# Patient Record
Sex: FEMALE | Race: WHITE | NOT HISPANIC OR LATINO | URBAN - NONMETROPOLITAN AREA
[De-identification: names, ages, dates, MRNs, and addresses within clinical notes are randomized per-mention and may not be internally consistent; named-entity substitution may affect disease eponyms.]

---

## 2019-11-06 ENCOUNTER — IMPORTED ENCOUNTER (OUTPATIENT)
Dept: URBAN - NONMETROPOLITAN AREA CLINIC 1 | Facility: CLINIC | Age: 64
End: 2019-11-06

## 2019-11-06 PROBLEM — H52.4: Noted: 2019-11-06

## 2019-11-06 PROBLEM — H25.13: Noted: 2019-11-06

## 2019-11-06 PROCEDURE — 92015 DETERMINE REFRACTIVE STATE: CPT

## 2019-11-06 PROCEDURE — 92004 COMPRE OPH EXAM NEW PT 1/>: CPT

## 2019-11-06 NOTE — PATIENT DISCUSSION
Presbyopia OUDiscussed refractive status in detail with patient. New glasses Rx given today. (not rec to fill)Continue to monitor. Yumiko OUDiscussed diagnosis with patient. Reviewed symptoms related to cataract progression. Discussed various treatment options with patient. Recommend cataract evaluation by Dr. Darshan Varela. Patient elects to schedule cat eval.Continue to monitor.

## 2019-12-16 ENCOUNTER — IMPORTED ENCOUNTER (OUTPATIENT)
Dept: URBAN - NONMETROPOLITAN AREA CLINIC 1 | Facility: CLINIC | Age: 64
End: 2019-12-16

## 2019-12-16 PROBLEM — H25.13: Noted: 2019-12-16

## 2019-12-16 PROCEDURE — 99214 OFFICE O/P EST MOD 30 MIN: CPT

## 2019-12-16 NOTE — PATIENT DISCUSSION
Cataract OU-Visually significant cataract OU.-Cataract(s) causing symptomatic impairment of visual function not correctable with a tolerable change in glasses or contact lenses lighting or non-operative means resulting in specific activity limitations and/or participation restrictions including but not limited to reading viewing television driving or meeting vocational or recreational needs. -Expectation is clearer vision and functional improvement in symptoms as well as reduced glare disability after cataract removal.-Order IOLMaster and OPD today. -Recommend LRI or toric/LenSx OU based on today's OPD testing and lifestyle questionnaire.-All questions were answered regarding surgery including pre and post-op medications appointments activity restrictions and anesthetic usage.-The risks benefits and alternatives and special risk factors for the patient were discussed in detail including but not limited to: bleeding infection retinal detachment vitreous loss problems with the implant and possible need for additional surgery.-Although rare the possibility of complete vision loss was discussed.-The possible need for glasses post-operatively was discussed.-Order medical clearance exam based on history of high cholesterol.-Patient elects to proceed with cataract surgery OD. Will schedule at patient's convenience and re-evaluate OS in the future. ***Patient will consider LRI or toric/LenSx OU (and will let us know) starting with OD. **Recommend begin art tears and lotemax and then repeat lai in 7 days.

## 2020-02-25 ENCOUNTER — IMPORTED ENCOUNTER (OUTPATIENT)
Dept: URBAN - NONMETROPOLITAN AREA CLINIC 1 | Facility: CLINIC | Age: 65
End: 2020-02-25

## 2020-02-25 PROBLEM — H25.13: Noted: 2020-02-25

## 2020-03-02 ENCOUNTER — IMPORTED ENCOUNTER (OUTPATIENT)
Dept: URBAN - NONMETROPOLITAN AREA CLINIC 1 | Facility: CLINIC | Age: 65
End: 2020-03-02

## 2020-03-02 PROBLEM — E78.5: Noted: 2020-03-02

## 2020-03-02 PROBLEM — Z01.818: Noted: 2020-03-02

## 2020-03-02 NOTE — PATIENT DISCUSSION
Cataract OU-Visually significant cataract OU.-Cataract(s) causing symptomatic impairment of visual function not correctable with a tolerable change in glasses or contact lenses lighting or non-operative means resulting in specific activity limitations and/or participation restrictions including but not limited to reading viewing television driving or meeting vocational or recreational needs. -Expectation is clearer vision and functional improvement in symptoms as well as reduced glare disability after cataract removal.-Recommend LRI or toric/LenSx OU-All questions were answered regarding surgery including pre and post-op medications appointments activity restrictions and anesthetic usage.-The risks benefits and alternatives and special risk factors for the patient were discussed in detail including but not limited to: bleeding infection retinal detachment vitreous loss problems with the implant and possible need for additional surgery.-Although rare the possibility of complete vision loss was discussed.-The possible need for glasses post-operatively was discussed.-Patient elects to proceed with cataract surgery OD. Will schedule at patient's convenience and re-evaluate OS in the future. ***Patient will consider LRI or toric/LenSx OU (and will let us know) starting with OD. **Recommend begin art tears and lotemax and then repeat lai in 7 days.

## 2020-11-02 ENCOUNTER — IMPORTED ENCOUNTER (OUTPATIENT)
Dept: URBAN - NONMETROPOLITAN AREA CLINIC 1 | Facility: CLINIC | Age: 65
End: 2020-11-02

## 2020-11-02 PROCEDURE — 92014 COMPRE OPH EXAM EST PT 1/>: CPT

## 2020-11-02 NOTE — PATIENT DISCUSSION
Cataract OU-Visually significant cataract OU.-Cataract(s) causing symptomatic impairment of visual function not correctable with a tolerable change in glasses or contact lenses lighting or non-operative means resulting in specific activity limitations and/or participation restrictions including but not limited to reading viewing television driving or meeting vocational or recreational needs. -Expectation is clearer vision and functional improvement in symptoms as well as reduced glare disability after cataract removal.-Recommend LRI or toric/LenSx OU-All questions were answered regarding surgery including pre and post-op medications appointments activity restrictions and anesthetic usage.-The risks benefits and alternatives and special risk factors for the patient were discussed in detail including but not limited to: bleeding infection retinal detachment vitreous loss problems with the implant and possible need for additional surgery.-Although rare the possibility of complete vision loss was discussed.-The possible need for glasses post-operatively was discussed.-Patient elects to proceed with cataract surgery OD. Will schedule at patient's convenience and re-evaluate OS in the future. ***Patient will consider LRI or toric/LenSx OU (and will let us know) starting Discussed all lens options in detail with patient. Discussed lens options with patient. Discussed Toric vs LRI . Recommend patient start AT and Lotemax TID  OU x 1 week and repeat lai. Dros given to patient Discussed LenSx vs Trad.  Discussed Toric OU and will consider this after repeat toposDo not recommend Panoptix

## 2020-11-17 ENCOUNTER — IMPORTED ENCOUNTER (OUTPATIENT)
Dept: URBAN - NONMETROPOLITAN AREA CLINIC 1 | Facility: CLINIC | Age: 65
End: 2020-11-17

## 2020-11-19 PROBLEM — Z01.818: Noted: 2020-11-19

## 2020-12-03 ENCOUNTER — IMPORTED ENCOUNTER (OUTPATIENT)
Dept: URBAN - NONMETROPOLITAN AREA CLINIC 1 | Facility: CLINIC | Age: 65
End: 2020-12-03

## 2020-12-03 PROBLEM — Z98.41: Noted: 2020-12-03

## 2020-12-03 PROBLEM — H25.812: Noted: 2020-12-03

## 2020-12-03 PROCEDURE — V2787 ASTIGMATISM-CORRECT FUNCTION: HCPCS

## 2020-12-03 PROCEDURE — 66984 XCAPSL CTRC RMVL W/O ECP: CPT

## 2020-12-03 PROCEDURE — 99024 POSTOP FOLLOW-UP VISIT: CPT

## 2020-12-03 PROCEDURE — 92136 OPHTHALMIC BIOMETRY: CPT

## 2020-12-03 NOTE — PATIENT DISCUSSION
Same Day s/p PCIOL OD Toric/LenSx (12/03/20)-Pt doing well s/p PCIOL. -Continue post-op gtts according to instruction sheet and sleep with eye shield over eye for 7 nights.-Avoid bending at the waist lifting anything over 5lbs and dirty or cyril environments. Cataract OS- Recommend proceed with cataract surgery as previously scheduled and discussed with Dr. Jayant Ibanez.

## 2020-12-08 ENCOUNTER — IMPORTED ENCOUNTER (OUTPATIENT)
Dept: URBAN - NONMETROPOLITAN AREA CLINIC 1 | Facility: CLINIC | Age: 65
End: 2020-12-08

## 2020-12-08 PROCEDURE — 99024 POSTOP FOLLOW-UP VISIT: CPT

## 2020-12-08 NOTE — PATIENT DISCUSSION
1 week s/p PCIOL OD Toric/LenSx (12/03/20)-Pt doing well at 1 week s/p PCIOL. -Continue post-op gtts according to instruction sheet.-Okay to resume usual activites and d/c eye shield. Cataract OS- Recommend proceed with cataract surgery as previously scheduled and discussed with Dr. Rosio Jane.

## 2020-12-18 ENCOUNTER — IMPORTED ENCOUNTER (OUTPATIENT)
Dept: URBAN - NONMETROPOLITAN AREA CLINIC 1 | Facility: CLINIC | Age: 65
End: 2020-12-18

## 2020-12-18 PROBLEM — Z98.41: Noted: 2020-12-18

## 2020-12-18 PROBLEM — Z98.42: Noted: 2020-12-18

## 2020-12-18 PROCEDURE — 99024 POSTOP FOLLOW-UP VISIT: CPT

## 2020-12-18 NOTE — PATIENT DISCUSSION
Same Day s/p PCIOL OS Toric/LenSx (12/17/20)-Pt doing well s/p PCIOL. -Continue post-op gtts according to instruction sheet and sleep with eye shield over eye for 7 nights.-Avoid bending at the waist lifting anything over 5lbs and dirty or cyril environments. 2 week s/p PCIOL OD Toric/LenSx (12/03/20)-Pt doing well at 2 week s/p PCIOL. -Continue post-op gtts according to instruction sheet.

## 2022-04-09 ASSESSMENT — VISUAL ACUITY
OS_CC: 20/40
OS_SC: 20/25
OD_SC: 20/30-
OS_SC: 20/25
OS_CC: 20/40
OD_SC: 20/25
OS_GLARE: 20/80
OS_CC: 20/40-
OD_GLARE: 20/100
OD_PAM: 20/20
OS_SC: 20/25
OD_GLARE: 20/100
OS_SC: 20/25
OS_CC: 20/40
OD_CC: 20/40-1
OD_SC: 20/30-
OS_CC: 20/40 -
OS_SC: 20/25
OD_CC: 20/25 +
OD_SC: 20/25
OS_SC: 20/30-
OS_GLARE: 20/80
OD_GLARE: 20/100
OD_PAM: 20/20
OS_AM: 20/20
OD_GLARE: 20/100
OD_PAM: 20/20
OD_SC: 20/30+
OD_CC: J1
OS_GLARE: 20/80
OS_GLARE: 20/80
OS_AM: 20/20
OD_SC: 20/25
OD_GLARE: 20/100
OS_CC: J1
OD_PAM: 20/20
OS_CC: 20/25
OS_CC: 20/40-
OD_CC: 20/20-
OS_GLARE: 20/80
OS_AM: 20/20
OS_GLARE: 20/80
OS_AM: 20/20
OD_PAM: 20/20
OD_CC: J1
OS_AM: 20/20
OS_AM: 20/20
OS_CC: 20/25
OS_CC: J1

## 2022-04-09 ASSESSMENT — TONOMETRY
OD_IOP_MMHG: 17
OD_IOP_MMHG: 13
OD_IOP_MMHG: 18
OS_IOP_MMHG: 15
OD_IOP_MMHG: 16
OS_IOP_MMHG: 16
OD_IOP_MMHG: 16
OS_IOP_MMHG: 16
OD_IOP_MMHG: 18
OS_IOP_MMHG: 18
OD_IOP_MMHG: 15
OS_IOP_MMHG: 13
OS_IOP_MMHG: 18
OS_IOP_MMHG: 16

## 2022-04-09 ASSESSMENT — KERATOMETRY
OD_K1POWER_DIOPTERS: 43.25
OD_AXISANGLE_DEGREES: 169
OD_K2POWER_DIOPTERS: 44.00
OS_K1POWER_DIOPTERS: 43.50
OS_AXISANGLE_DEGREES: 003
OS_K2POWER_DIOPTERS: 44.00

## 2023-01-11 ENCOUNTER — ESTABLISHED PATIENT (OUTPATIENT)
Dept: RURAL CLINIC 3 | Facility: CLINIC | Age: 68
End: 2023-01-11

## 2023-01-11 DIAGNOSIS — H52.4: ICD-10-CM

## 2023-01-11 PROCEDURE — 99214 OFFICE O/P EST MOD 30 MIN: CPT

## 2023-01-11 PROCEDURE — 92015 DETERMINE REFRACTIVE STATE: CPT

## 2023-01-11 ASSESSMENT — TONOMETRY
OD_IOP_MMHG: 15
OS_IOP_MMHG: 15

## 2023-01-11 ASSESSMENT — VISUAL ACUITY
OD_SC: 20/25-2
OS_SC: 20/20

## 2024-03-19 ENCOUNTER — ESTABLISHED PATIENT (OUTPATIENT)
Dept: RURAL CLINIC 3 | Facility: CLINIC | Age: 69
End: 2024-03-19

## 2024-03-19 DIAGNOSIS — H11.041: ICD-10-CM

## 2024-03-19 DIAGNOSIS — H52.4: ICD-10-CM

## 2024-03-19 DIAGNOSIS — Z96.1: ICD-10-CM

## 2024-03-19 DIAGNOSIS — H10.423: ICD-10-CM

## 2024-03-19 PROCEDURE — 92014 COMPRE OPH EXAM EST PT 1/>: CPT

## 2024-03-19 PROCEDURE — 92015 DETERMINE REFRACTIVE STATE: CPT

## 2024-03-19 ASSESSMENT — VISUAL ACUITY
OD_SC: 20/25-2
OU_SC: 20/20
OS_SC: 20/20

## 2024-03-19 ASSESSMENT — TONOMETRY
OD_IOP_MMHG: 16
OS_IOP_MMHG: 16